# Patient Record
Sex: FEMALE | Race: WHITE
[De-identification: names, ages, dates, MRNs, and addresses within clinical notes are randomized per-mention and may not be internally consistent; named-entity substitution may affect disease eponyms.]

---

## 2017-02-08 ENCOUNTER — HOSPITAL ENCOUNTER (OUTPATIENT)
Dept: HOSPITAL 53 - M OPP | Age: 57
End: 2017-02-08
Attending: INTERNAL MEDICINE
Payer: COMMERCIAL

## 2017-02-08 VITALS — BODY MASS INDEX: 21.16 KG/M2 | WEIGHT: 115 LBS | HEIGHT: 62 IN

## 2017-02-08 VITALS — DIASTOLIC BLOOD PRESSURE: 55 MMHG | SYSTOLIC BLOOD PRESSURE: 109 MMHG

## 2017-02-08 DIAGNOSIS — Z79.82: ICD-10-CM

## 2017-02-08 DIAGNOSIS — M50.90: ICD-10-CM

## 2017-02-08 DIAGNOSIS — K57.30: ICD-10-CM

## 2017-02-08 DIAGNOSIS — R93.3: Primary | ICD-10-CM

## 2017-02-08 DIAGNOSIS — Z79.51: ICD-10-CM

## 2017-02-08 DIAGNOSIS — F41.9: ICD-10-CM

## 2017-02-08 DIAGNOSIS — R12: ICD-10-CM

## 2017-02-08 DIAGNOSIS — E78.5: ICD-10-CM

## 2017-02-08 DIAGNOSIS — K64.0: ICD-10-CM

## 2017-02-08 DIAGNOSIS — Z87.891: ICD-10-CM

## 2017-02-08 DIAGNOSIS — M81.0: ICD-10-CM

## 2017-02-08 DIAGNOSIS — Z79.899: ICD-10-CM

## 2017-02-08 DIAGNOSIS — J44.9: ICD-10-CM

## 2017-02-08 DIAGNOSIS — Z80.3: ICD-10-CM

## 2017-02-08 DIAGNOSIS — K57.32: ICD-10-CM

## 2017-02-08 DIAGNOSIS — G43.909: ICD-10-CM

## 2017-02-08 PROCEDURE — 45378 DIAGNOSTIC COLONOSCOPY: CPT

## 2017-02-08 PROCEDURE — 99157 MOD SED OTHER PHYS/QHP EA: CPT

## 2017-02-08 PROCEDURE — 99156 MOD SED OTH PHYS/QHP 5/>YRS: CPT

## 2017-02-08 NOTE — ROOR
________________________________________________________________________________

Patient Name: Tami Collins         Procedure Date: 2/8/2017 2:11 PM

MRN: [id number]                          Account Number: [id number]

YOB: 1960               Age: 56

Room: [facility]                            Gender: Female

Note Status: Finalized                 

________________________________________________________________________________

 

Procedure:           Colonoscopy to Cecum

Indications:         Last colonoscopy: 2008, Abnormal CT of the GI tract, 

                     Follow-up of diverticulitis

Providers:           Damion Huang MD

Referring MD:        ALEJANDRO Chiang

Requesting Provider: 

Medicines:           Monitored Anesthesia Care

Complications:       No immediate complications.

________________________________________________________________________________

Procedure:           Pre-Anesthesia Assessment:

                     - The heart rate, respiratory rate, oxygen saturations, 

                     blood pressure, adequacy of pulmonary ventilation, and 

                     response to care were monitored throughout the procedure.

                     The Colonoscope was introduced through the anus and 

                     advanced to the cecum, identified by appendiceal orifice 

                     and ileocecal valve. The colonoscopy was performed 

                     without difficulty. The patient tolerated the procedure 

                     well. The quality of the bowel preparation was excellent.

                                                                                

Findings:

     The perianal and digital rectal examinations were normal.

     Non-bleeding internal hemorrhoids were found during retroflexion. The 

     hemorrhoids were small and Grade I (internal hemorrhoids that do not 

     prolapse).

     Multiple small and large-mouthed diverticula were found in the 

     recto-sigmoid colon, sigmoid colon and descending colon.

     The exam was otherwise without abnormality on direct and retroflexion 

     views.

                                                                                

Impression:          - Non-bleeding internal hemorrhoids.

                     - Diverticulosis in the recto-sigmoid colon, in the 

                     sigmoid colon and in the descending colon.

                     - The examination was otherwise normal on direct and 

                     retroflexion views.

                     - No specimens collected.

                     - The exam was otherwise normal to the cecum.

Recommendation:      - Patient has a contact number available for emergencies. 

                     The signs and symptoms of potential delayed complications 

                     were discussed with the patient. Return to normal 

                     activities tomorrow. Written discharge instructions were 

                     provided to the patient.

                     - High fiber diet.

                     - Discharge patient to home.

                     - Continue present medications.

                     - Repeat colonoscopy in 10 years for screening purposes.

                     - Return to referring physician.

                     - The findings and recommendations were discussed with 

                     the patient's family.

                                                                                

 

Damion Huang MD

____________________

Damion Huang MD

2/8/2017 2:34:27 PM

This report has been signed electronically.

Number of Addenda: 0

 

Note Initiated On: 2/8/2017 2:11 PM

Estimated Blood Loss:

     Estimated blood loss: none.

## 2017-06-27 ENCOUNTER — HOSPITAL ENCOUNTER (OUTPATIENT)
Dept: HOSPITAL 53 - M SFHCLERA | Age: 57
End: 2017-06-27
Attending: PHYSICIAN ASSISTANT
Payer: COMMERCIAL

## 2017-06-27 DIAGNOSIS — N28.9: Primary | ICD-10-CM

## 2017-06-27 DIAGNOSIS — E78.2: ICD-10-CM

## 2017-06-27 LAB
ALBUMIN SERPL BCG-MCNC: 4 GM/DL (ref 3.2–5.2)
ALBUMIN/GLOB SERPL: 1.33 {RATIO} (ref 1–1.93)
ALP SERPL-CCNC: 48 U/L (ref 45–117)
ALT SERPL W P-5'-P-CCNC: 22 U/L (ref 12–78)
ANION GAP SERPL CALC-SCNC: 5 MEQ/L (ref 8–16)
AST SERPL-CCNC: 18 U/L (ref 15–37)
BILIRUB SERPL-MCNC: 0.3 MG/DL (ref 0.2–1)
BUN SERPL-MCNC: 16 MG/DL (ref 7–18)
CALCIUM SERPL-MCNC: 8.4 MG/DL (ref 8.5–10.1)
CHLORIDE SERPL-SCNC: 106 MEQ/L (ref 98–107)
CHOLEST SERPL-MCNC: 189 MG/DL (ref ?–200)
CO2 SERPL-SCNC: 29 MEQ/L (ref 21–32)
CREAT SERPL-MCNC: 0.89 MG/DL (ref 0.55–1.02)
ERYTHROCYTE [DISTWIDTH] IN BLOOD BY AUTOMATED COUNT: 11.9 % (ref 11.5–14.5)
GFR SERPL CREATININE-BSD FRML MDRD: > 60 ML/MIN/{1.73_M2} (ref 51–?)
GLUCOSE SERPL-MCNC: 80 MG/DL (ref 70–105)
MCH RBC QN AUTO: 32.2 PG (ref 27–33)
MCHC RBC AUTO-ENTMCNC: 32.7 G/DL (ref 32–36.5)
MCV RBC AUTO: 98.6 FL (ref 80–96)
PLATELET # BLD AUTO: 288 K/MM3 (ref 150–450)
POTASSIUM SERPL-SCNC: 4.4 MEQ/L (ref 3.5–5.1)
PROT SERPL-MCNC: 7 GM/DL (ref 6.4–8.2)
SODIUM SERPL-SCNC: 140 MEQ/L (ref 136–145)
TRIGL SERPL-MCNC: 205 MG/DL (ref ?–150)
WBC # BLD AUTO: 5.1 K/MM3 (ref 4–10)

## 2017-07-18 ENCOUNTER — HOSPITAL ENCOUNTER (OUTPATIENT)
Dept: HOSPITAL 53 - M LAB REF | Age: 57
End: 2017-07-18
Attending: OBSTETRICS & GYNECOLOGY
Payer: COMMERCIAL

## 2017-07-18 DIAGNOSIS — R68.82: ICD-10-CM

## 2017-07-18 DIAGNOSIS — E34.9: ICD-10-CM

## 2017-07-18 DIAGNOSIS — N95.1: Primary | ICD-10-CM

## 2017-07-18 LAB
ESTRADIOL SERPL-MCNC: < 19 PG/ML
FSH SERPL-ACNC: 90.2 MIU/ML
LH SERPL-ACNC: 47.1 MIU/ML
T4 FREE SERPL-MCNC: 1.03 NG/DL (ref 0.76–1.46)

## 2017-12-20 ENCOUNTER — HOSPITAL ENCOUNTER (OUTPATIENT)
Dept: HOSPITAL 53 - M SFHCLERA | Age: 57
End: 2017-12-20
Attending: PHYSICIAN ASSISTANT
Payer: COMMERCIAL

## 2017-12-20 DIAGNOSIS — L29.9: ICD-10-CM

## 2017-12-20 DIAGNOSIS — E55.9: ICD-10-CM

## 2017-12-20 DIAGNOSIS — E78.2: Primary | ICD-10-CM

## 2017-12-20 LAB
ALBUMIN SERPL BCG-MCNC: 4 GM/DL (ref 3.2–5.2)
ALBUMIN/GLOB SERPL: 1.38 {RATIO} (ref 1–1.93)
ALP SERPL-CCNC: 41 U/L (ref 45–117)
ALT SERPL W P-5'-P-CCNC: 32 U/L (ref 12–78)
ANION GAP SERPL CALC-SCNC: 6 MEQ/L (ref 8–16)
AST SERPL-CCNC: 25 U/L (ref 7–37)
BASOPHILS # BLD AUTO: 0.1 10^3/UL (ref 0–0.2)
BASOPHILS NFR BLD AUTO: 0.8 % (ref 0–1)
BILIRUB SERPL-MCNC: 0.4 MG/DL (ref 0.2–1)
BUN SERPL-MCNC: 19 MG/DL (ref 7–18)
CALCIUM SERPL-MCNC: 8.3 MG/DL (ref 8.5–10.1)
CHLORIDE SERPL-SCNC: 102 MEQ/L (ref 98–107)
CHOLEST SERPL-MCNC: 135 MG/DL (ref ?–200)
CO2 SERPL-SCNC: 31 MEQ/L (ref 21–32)
CREAT SERPL-MCNC: 1.02 MG/DL (ref 0.55–1.02)
EOSINOPHIL # BLD AUTO: 0.2 10^3/UL (ref 0–0.5)
EOSINOPHIL NFR BLD AUTO: 2 % (ref 0–3)
ERYTHROCYTE [DISTWIDTH] IN BLOOD BY AUTOMATED COUNT: 11.9 % (ref 11.5–14.5)
GFR SERPL CREATININE-BSD FRML MDRD: 59 ML/MIN/{1.73_M2} (ref 51–?)
GLUCOSE SERPL-MCNC: 78 MG/DL (ref 70–105)
IMM GRANULOCYTES NFR BLD: 0.7 % (ref 0–0)
LYMPHOCYTES # BLD AUTO: 2 10^3/UL (ref 1.5–4.5)
LYMPHOCYTES NFR BLD AUTO: 26.6 % (ref 24–44)
MCH RBC QN AUTO: 29.7 PG (ref 27–33)
MCHC RBC AUTO-ENTMCNC: 33.7 G/DL (ref 32–36.5)
MCV RBC AUTO: 88.1 FL (ref 80–96)
MONOCYTES # BLD AUTO: 0.9 10^3/UL (ref 0–0.8)
MONOCYTES NFR BLD AUTO: 11.6 % (ref 0–5)
NEUTROPHILS # BLD AUTO: 4.5 10^3/UL (ref 1.8–7.7)
NEUTROPHILS NFR BLD AUTO: 58.3 % (ref 36–66)
NRBC BLD AUTO-RTO: 0 % (ref 0–0)
PLATELET # BLD AUTO: 289 10^3/UL (ref 150–450)
POTASSIUM SERPL-SCNC: 4 MEQ/L (ref 3.5–5.1)
PROT SERPL-MCNC: 6.9 GM/DL (ref 6.4–8.2)
SODIUM SERPL-SCNC: 139 MEQ/L (ref 136–145)
TRIGL SERPL-MCNC: 112 MG/DL (ref ?–150)
WBC # BLD AUTO: 7.7 10^3/UL (ref 4–10)

## 2017-12-27 ENCOUNTER — HOSPITAL ENCOUNTER (OUTPATIENT)
Dept: HOSPITAL 53 - M RAD | Age: 57
End: 2017-12-27
Attending: PHYSICIAN ASSISTANT
Payer: COMMERCIAL

## 2017-12-27 DIAGNOSIS — R91.8: Primary | ICD-10-CM

## 2017-12-27 DIAGNOSIS — N28.1: ICD-10-CM

## 2018-06-27 ENCOUNTER — HOSPITAL ENCOUNTER (OUTPATIENT)
Dept: HOSPITAL 53 - M SFHCLERA | Age: 58
End: 2018-06-27
Attending: PHYSICIAN ASSISTANT
Payer: COMMERCIAL

## 2018-06-27 DIAGNOSIS — J44.1: ICD-10-CM

## 2018-06-27 DIAGNOSIS — E55.9: ICD-10-CM

## 2018-06-27 DIAGNOSIS — E78.2: Primary | ICD-10-CM

## 2018-06-27 LAB
25(OH)D3 SERPL-MCNC: 21.2 NG/ML (ref 30–100)
ALBUMIN/GLOBULIN RATIO: 1.16 (ref 1–1.93)
ALBUMIN: 3.6 GM/DL (ref 3.2–5.2)
ALKALINE PHOSPHATASE: 50 U/L (ref 45–117)
ALT SERPL W P-5'-P-CCNC: 32 U/L (ref 12–78)
ANION GAP: 7 MEQ/L (ref 8–16)
AST SERPL-CCNC: 18 U/L (ref 7–37)
BILIRUBIN,TOTAL: 0.3 MG/DL (ref 0.2–1)
BLOOD UREA NITROGEN: 14 MG/DL (ref 7–18)
CALCIUM LEVEL: 8.3 MG/DL (ref 8.8–10.2)
CARBON DIOXIDE LEVEL: 27 MEQ/L (ref 21–32)
CHLORIDE LEVEL: 106 MEQ/L (ref 98–107)
CHOLEST SERPL-MCNC: 122 MG/DL (ref ?–200)
CHOLESTEROL RISK RATIO: 2.98 (ref ?–5)
CREATININE FOR GFR: 1.01 MG/DL (ref 0.55–1.3)
GFR SERPL CREATININE-BSD FRML MDRD: 59.5 ML/MIN/{1.73_M2} (ref 45–?)
GLUCOSE, FASTING: 95 MG/DL (ref 70–100)
HDLC SERPL-MCNC: 41 MG/DL (ref 40–?)
HEMATOCRIT: 38.6 % (ref 36–47)
HEMOGLOBIN: 12.5 G/DL (ref 12–15.5)
LDL CHOLESTEROL: 53.4 MG/DL (ref ?–100)
MEAN CORPUSCULAR HEMOGLOBIN: 30 PG (ref 27–33)
MEAN CORPUSCULAR HGB CONC: 32.4 G/DL (ref 32–36.5)
MEAN CORPUSCULAR VOLUME: 92.8 FL (ref 80–96)
NONHDLC SERPL-MCNC: 81 MG/DL
NRBC BLD AUTO-RTO: 0 % (ref 0–0)
PLATELET COUNT, AUTOMATED: 265 10^3/UL (ref 150–450)
POTASSIUM SERUM: 4.4 MEQ/L (ref 3.5–5.1)
RED BLOOD COUNT: 4.16 10^6/UL (ref 4–5.4)
RED CELL DISTRIBUTION WIDTH: 12.4 % (ref 11.5–14.5)
SODIUM LEVEL: 140 MEQ/L (ref 136–145)
TOTAL PROTEIN: 6.7 GM/DL (ref 6.4–8.2)
TRIGLYCERIDES LEVEL: 138 MG/DL (ref ?–150)
WHITE BLOOD COUNT: 8.3 10^3/UL (ref 4–10)

## 2018-06-27 PROCEDURE — 80053 COMPREHEN METABOLIC PANEL: CPT

## 2019-01-27 ENCOUNTER — HOSPITAL ENCOUNTER (OUTPATIENT)
Dept: HOSPITAL 53 - M SFHCADAM | Age: 59
End: 2019-01-27
Attending: PHYSICIAN ASSISTANT
Payer: COMMERCIAL

## 2019-01-27 ENCOUNTER — HOSPITAL ENCOUNTER (OUTPATIENT)
Dept: HOSPITAL 53 - M ADAMS | Age: 59
End: 2019-01-27
Attending: PHYSICIAN ASSISTANT
Payer: COMMERCIAL

## 2019-01-27 DIAGNOSIS — E78.2: ICD-10-CM

## 2019-01-27 DIAGNOSIS — E78.2: Primary | ICD-10-CM

## 2019-01-27 DIAGNOSIS — K76.0: Primary | ICD-10-CM

## 2019-01-27 DIAGNOSIS — K21.9: ICD-10-CM

## 2019-01-27 DIAGNOSIS — E78.1: ICD-10-CM

## 2019-01-27 DIAGNOSIS — M81.0: ICD-10-CM

## 2019-01-27 DIAGNOSIS — E55.9: ICD-10-CM

## 2019-01-27 DIAGNOSIS — K76.0: ICD-10-CM

## 2019-01-27 LAB
ALBUMIN SERPL BCG-MCNC: 3.9 GM/DL (ref 3.2–5.2)
ALT SERPL W P-5'-P-CCNC: 43 U/L (ref 12–78)
BILIRUB SERPL-MCNC: 0.5 MG/DL (ref 0.2–1)
BUN SERPL-MCNC: 15 MG/DL (ref 7–18)
CALCIUM SERPL-MCNC: 7.9 MG/DL (ref 8.8–10.2)
CHLORIDE SERPL-SCNC: 105 MEQ/L (ref 98–107)
CHOLEST SERPL-MCNC: 139 MG/DL (ref ?–200)
CHOLEST/HDLC SERPL: 2.84 {RATIO} (ref ?–5)
CO2 SERPL-SCNC: 27 MEQ/L (ref 21–32)
CREAT SERPL-MCNC: 1 MG/DL (ref 0.55–1.3)
GFR SERPL CREATININE-BSD FRML MDRD: > 60 ML/MIN/{1.73_M2} (ref 45–?)
GLUCOSE SERPL-MCNC: 95 MG/DL (ref 70–100)
HDLC SERPL-MCNC: 49 MG/DL (ref 40–?)
LDLC SERPL CALC-MCNC: 65 MG/DL (ref ?–100)
NONHDLC SERPL-MCNC: 90 MG/DL
POTASSIUM SERPL-SCNC: 4.1 MEQ/L (ref 3.5–5.1)
PROT SERPL-MCNC: 6.6 GM/DL (ref 6.4–8.2)
SODIUM SERPL-SCNC: 141 MEQ/L (ref 136–145)
TRIGL SERPL-MCNC: 126 MG/DL (ref ?–150)
TSH SERPL DL<=0.005 MIU/L-ACNC: 2.92 UIU/ML (ref 0.36–3.74)

## 2019-01-28 LAB — 25(OH)D3 SERPL-MCNC: 25.6 NG/ML (ref 30–100)

## 2019-10-01 ENCOUNTER — HOSPITAL ENCOUNTER (OUTPATIENT)
Dept: HOSPITAL 53 - M LABDRWAD | Age: 59
End: 2019-10-01
Attending: PHYSICIAN ASSISTANT
Payer: COMMERCIAL

## 2019-10-01 DIAGNOSIS — Z20.4: ICD-10-CM

## 2019-10-01 DIAGNOSIS — R76.11: Primary | ICD-10-CM

## 2019-10-01 DIAGNOSIS — Z20.828: ICD-10-CM

## 2019-10-02 LAB — RUBELLA IGG QUALITATIVE: (no result)

## 2019-10-10 ENCOUNTER — HOSPITAL ENCOUNTER (OUTPATIENT)
Dept: HOSPITAL 53 - M ADAMS | Age: 59
End: 2019-10-10
Attending: PHYSICIAN ASSISTANT
Payer: COMMERCIAL

## 2019-10-10 DIAGNOSIS — J98.4: Primary | ICD-10-CM

## 2019-10-10 DIAGNOSIS — Z87.891: ICD-10-CM

## 2019-10-10 NOTE — REP
PA and lateral chest:

Comparisons are 07/11/2012 and 03/25/2016.

Lung fields are chronically hyperinflated but otherwise clear , unchanged.

Cardiac size is normal.  The tj, mediastinum, skeletal structures are

unremarkable.

Impression:

Chronic hyperinflation, otherwise negative PA and lateral chest.

 

 

Electronically Signed by

Shan Degroot MD 10/10/2019 05:46 P

## 2019-10-21 ENCOUNTER — HOSPITAL ENCOUNTER (OUTPATIENT)
Dept: HOSPITAL 53 - M SFHCADAM | Age: 59
End: 2019-10-21
Attending: PHYSICIAN ASSISTANT
Payer: COMMERCIAL

## 2019-10-21 DIAGNOSIS — Z22.7: Primary | ICD-10-CM

## 2019-10-21 LAB
ALBUMIN SERPL BCG-MCNC: 3.9 GM/DL (ref 3.2–5.2)
ALT SERPL W P-5'-P-CCNC: 35 U/L (ref 12–78)
BILIRUB CONJ SERPL-MCNC: < 0.1 MG/DL (ref 0–0.2)
BILIRUB SERPL-MCNC: 0.2 MG/DL (ref 0.2–1)
PROT SERPL-MCNC: 7.1 GM/DL (ref 6.4–8.2)

## 2019-11-21 ENCOUNTER — HOSPITAL ENCOUNTER (OUTPATIENT)
Dept: HOSPITAL 53 - M SFHCADAM | Age: 59
End: 2019-11-21
Attending: PHYSICIAN ASSISTANT
Payer: COMMERCIAL

## 2019-11-21 DIAGNOSIS — Z22.7: Primary | ICD-10-CM

## 2019-11-21 LAB
ALBUMIN SERPL BCG-MCNC: 4 GM/DL (ref 3.2–5.2)
ALT SERPL W P-5'-P-CCNC: 34 U/L (ref 12–78)
BILIRUB CONJ SERPL-MCNC: 0.1 MG/DL (ref 0–0.2)
BILIRUB SERPL-MCNC: 0.3 MG/DL (ref 0.2–1)
PROT SERPL-MCNC: 7 GM/DL (ref 6.4–8.2)

## 2019-12-19 ENCOUNTER — HOSPITAL ENCOUNTER (OUTPATIENT)
Dept: HOSPITAL 53 - M SFHCADAM | Age: 59
End: 2019-12-19
Attending: PHYSICIAN ASSISTANT
Payer: COMMERCIAL

## 2019-12-19 DIAGNOSIS — Z22.7: Primary | ICD-10-CM

## 2019-12-19 LAB
ALBUMIN SERPL BCG-MCNC: 4.1 GM/DL (ref 3.2–5.2)
ALT SERPL W P-5'-P-CCNC: 34 U/L (ref 12–78)
BILIRUB CONJ SERPL-MCNC: 0.2 MG/DL (ref 0–0.2)
BILIRUB SERPL-MCNC: 0.3 MG/DL (ref 0.2–1)
PROT SERPL-MCNC: 7.2 GM/DL (ref 6.4–8.2)

## 2020-01-03 ENCOUNTER — HOSPITAL ENCOUNTER (OUTPATIENT)
Dept: HOSPITAL 53 - M LABDRWAD | Age: 60
End: 2020-01-03
Attending: PHYSICIAN ASSISTANT
Payer: COMMERCIAL

## 2020-01-03 DIAGNOSIS — Z22.7: Primary | ICD-10-CM

## 2020-01-03 LAB
ALBUMIN SERPL BCG-MCNC: 4 GM/DL (ref 3.2–5.2)
ALT SERPL W P-5'-P-CCNC: 34 U/L (ref 12–78)
BILIRUB CONJ SERPL-MCNC: < 0.1 MG/DL (ref 0–0.2)
BILIRUB SERPL-MCNC: 0.3 MG/DL (ref 0.2–1)
PROT SERPL-MCNC: 7 GM/DL (ref 6.4–8.2)

## 2020-01-21 ENCOUNTER — HOSPITAL ENCOUNTER (OUTPATIENT)
Dept: HOSPITAL 53 - M SFHCADAM | Age: 60
End: 2020-01-21
Attending: PHYSICIAN ASSISTANT
Payer: COMMERCIAL

## 2020-01-21 DIAGNOSIS — Z22.7: Primary | ICD-10-CM

## 2020-01-21 LAB
ALBUMIN SERPL BCG-MCNC: 4.3 GM/DL (ref 3.2–5.2)
ALT SERPL W P-5'-P-CCNC: 37 U/L (ref 12–78)
BILIRUB CONJ SERPL-MCNC: 0.2 MG/DL (ref 0–0.2)
BILIRUB SERPL-MCNC: 0.3 MG/DL (ref 0.2–1)
PROT SERPL-MCNC: 7.1 GM/DL (ref 6.4–8.2)

## 2020-02-21 ENCOUNTER — HOSPITAL ENCOUNTER (OUTPATIENT)
Dept: HOSPITAL 53 - M SFHCADAM | Age: 60
End: 2020-02-21
Attending: PHYSICIAN ASSISTANT
Payer: COMMERCIAL

## 2020-02-21 DIAGNOSIS — E55.9: ICD-10-CM

## 2020-02-21 DIAGNOSIS — K21.9: ICD-10-CM

## 2020-02-21 DIAGNOSIS — M81.0: ICD-10-CM

## 2020-02-21 DIAGNOSIS — K76.0: ICD-10-CM

## 2020-02-21 DIAGNOSIS — E78.2: Primary | ICD-10-CM

## 2020-02-21 DIAGNOSIS — Z22.7: ICD-10-CM

## 2020-02-21 LAB
25(OH)D3 SERPL-MCNC: 29.3 NG/ML (ref 30–100)
ALBUMIN SERPL BCG-MCNC: 4.3 GM/DL (ref 3.2–5.2)
ALT SERPL W P-5'-P-CCNC: 37 U/L (ref 12–78)
BASOPHILS # BLD AUTO: 0.1 10^3/UL (ref 0–0.2)
BASOPHILS NFR BLD AUTO: 0.8 % (ref 0–1)
BILIRUB CONJ SERPL-MCNC: 0.1 MG/DL (ref 0–0.2)
BILIRUB SERPL-MCNC: 0.3 MG/DL (ref 0.2–1)
BUN SERPL-MCNC: 25 MG/DL (ref 7–18)
CALCIUM SERPL-MCNC: 8.6 MG/DL (ref 8.8–10.2)
CHLORIDE SERPL-SCNC: 106 MEQ/L (ref 98–107)
CHOLEST SERPL-MCNC: 149 MG/DL (ref ?–200)
CHOLEST/HDLC SERPL: 3.39 {RATIO} (ref ?–5)
CO2 SERPL-SCNC: 28 MEQ/L (ref 21–32)
CREAT SERPL-MCNC: 0.99 MG/DL (ref 0.55–1.3)
EOSINOPHIL # BLD AUTO: 0.3 10^3/UL (ref 0–0.5)
EOSINOPHIL NFR BLD AUTO: 4.4 % (ref 0–3)
GFR SERPL CREATININE-BSD FRML MDRD: > 60 ML/MIN/{1.73_M2} (ref 45–?)
GLUCOSE SERPL-MCNC: 93 MG/DL (ref 70–100)
HCT VFR BLD AUTO: 37.7 % (ref 36–47)
HDLC SERPL-MCNC: 44 MG/DL (ref 40–?)
HGB BLD-MCNC: 12 G/DL (ref 12–15.5)
LDLC SERPL CALC-MCNC: 76 MG/DL (ref ?–100)
LYMPHOCYTES # BLD AUTO: 1.7 10^3/UL (ref 1.5–5)
LYMPHOCYTES NFR BLD AUTO: 25.2 % (ref 24–44)
MCH RBC QN AUTO: 30.2 PG (ref 27–33)
MCHC RBC AUTO-ENTMCNC: 31.8 G/DL (ref 32–36.5)
MCV RBC AUTO: 95 FL (ref 80–96)
MONOCYTES # BLD AUTO: 0.7 10^3/UL (ref 0–0.8)
MONOCYTES NFR BLD AUTO: 10.2 % (ref 0–5)
NEUTROPHILS # BLD AUTO: 3.9 10^3/UL (ref 1.5–8.5)
NEUTROPHILS NFR BLD AUTO: 58.6 % (ref 36–66)
NONHDLC SERPL-MCNC: 105 MG/DL
PLATELET # BLD AUTO: 249 10^3/UL (ref 150–450)
POTASSIUM SERPL-SCNC: 4.1 MEQ/L (ref 3.5–5.1)
PROT SERPL-MCNC: 7.2 GM/DL (ref 6.4–8.2)
RBC # BLD AUTO: 3.97 10^6/UL (ref 4–5.4)
SODIUM SERPL-SCNC: 138 MEQ/L (ref 136–145)
T4 FREE SERPL-MCNC: 1.08 NG/DL (ref 0.76–1.46)
TRIGL SERPL-MCNC: 144 MG/DL (ref ?–150)
TSH SERPL DL<=0.005 MIU/L-ACNC: 4.15 UIU/ML (ref 0.36–3.74)
WBC # BLD AUTO: 6.6 10^3/UL (ref 4–10)

## 2020-03-20 ENCOUNTER — HOSPITAL ENCOUNTER (OUTPATIENT)
Dept: HOSPITAL 53 - M SFHCADAM | Age: 60
End: 2020-03-20
Attending: PHYSICIAN ASSISTANT
Payer: COMMERCIAL

## 2020-03-20 DIAGNOSIS — R79.89: Primary | ICD-10-CM

## 2020-03-20 DIAGNOSIS — Z22.7: ICD-10-CM

## 2020-03-20 LAB
ALBUMIN SERPL BCG-MCNC: 3.8 GM/DL (ref 3.2–5.2)
ALT SERPL W P-5'-P-CCNC: 35 U/L (ref 12–78)
BILIRUB CONJ SERPL-MCNC: < 0.1 MG/DL (ref 0–0.2)
BILIRUB SERPL-MCNC: 0.2 MG/DL (ref 0.2–1)
PROT SERPL-MCNC: 6.5 GM/DL (ref 6.4–8.2)
T4 FREE SERPL-MCNC: 0.95 NG/DL (ref 0.76–1.46)
TSH SERPL DL<=0.005 MIU/L-ACNC: 2.09 UIU/ML (ref 0.36–3.74)

## 2020-04-22 ENCOUNTER — HOSPITAL ENCOUNTER (OUTPATIENT)
Dept: HOSPITAL 53 - M SFHCADAM | Age: 60
End: 2020-04-22
Attending: PHYSICIAN ASSISTANT
Payer: COMMERCIAL

## 2020-04-22 DIAGNOSIS — Z22.7: Primary | ICD-10-CM

## 2020-04-22 LAB
ALBUMIN SERPL BCG-MCNC: 3.9 GM/DL (ref 3.2–5.2)
ALT SERPL W P-5'-P-CCNC: 34 U/L (ref 12–78)
BILIRUB CONJ SERPL-MCNC: 0.1 MG/DL (ref 0–0.2)
BILIRUB SERPL-MCNC: 0.4 MG/DL (ref 0.2–1)
PROT SERPL-MCNC: 7 GM/DL (ref 6.4–8.2)

## 2020-05-21 ENCOUNTER — HOSPITAL ENCOUNTER (OUTPATIENT)
Dept: HOSPITAL 53 - M SFHCADAM | Age: 60
End: 2020-05-21
Attending: PHYSICIAN ASSISTANT
Payer: COMMERCIAL

## 2020-05-21 DIAGNOSIS — Z22.7: Primary | ICD-10-CM

## 2020-05-21 LAB
ALBUMIN SERPL BCG-MCNC: 4.1 GM/DL (ref 3.2–5.2)
ALT SERPL W P-5'-P-CCNC: 39 U/L (ref 12–78)
BILIRUB CONJ SERPL-MCNC: < 0.1 MG/DL (ref 0–0.2)
BILIRUB SERPL-MCNC: 0.3 MG/DL (ref 0.2–1)
PROT SERPL-MCNC: 6.8 GM/DL (ref 6.4–8.2)

## 2020-06-24 ENCOUNTER — HOSPITAL ENCOUNTER (OUTPATIENT)
Dept: HOSPITAL 53 - M SFHCADAM | Age: 60
End: 2020-06-24
Attending: PHYSICIAN ASSISTANT
Payer: COMMERCIAL

## 2020-06-24 DIAGNOSIS — E78.2: ICD-10-CM

## 2020-06-24 DIAGNOSIS — Z22.7: Primary | ICD-10-CM

## 2020-06-24 DIAGNOSIS — K76.0: ICD-10-CM

## 2020-06-24 DIAGNOSIS — E78.1: ICD-10-CM

## 2020-06-24 DIAGNOSIS — E55.9: ICD-10-CM

## 2020-06-24 LAB
25(OH)D3 SERPL-MCNC: 25.6 NG/ML (ref 30–100)
ALBUMIN SERPL BCG-MCNC: 3.8 GM/DL (ref 3.2–5.2)
ALT SERPL W P-5'-P-CCNC: 30 U/L (ref 12–78)
BASOPHILS # BLD AUTO: 0 10^3/UL (ref 0–0.2)
BASOPHILS NFR BLD AUTO: 0.8 % (ref 0–1)
BILIRUB SERPL-MCNC: 0.3 MG/DL (ref 0.2–1)
BUN SERPL-MCNC: 18 MG/DL (ref 7–18)
CALCIUM SERPL-MCNC: 8.6 MG/DL (ref 8.8–10.2)
CHLORIDE SERPL-SCNC: 109 MEQ/L (ref 98–107)
CHOLEST SERPL-MCNC: 133 MG/DL (ref ?–200)
CHOLEST/HDLC SERPL: 3.91 {RATIO} (ref ?–5)
CO2 SERPL-SCNC: 27 MEQ/L (ref 21–32)
CREAT SERPL-MCNC: 0.97 MG/DL (ref 0.55–1.3)
EOSINOPHIL # BLD AUTO: 0.1 10^3/UL (ref 0–0.5)
EOSINOPHIL NFR BLD AUTO: 2.7 % (ref 0–3)
GFR SERPL CREATININE-BSD FRML MDRD: > 60 ML/MIN/{1.73_M2} (ref 45–?)
GLUCOSE SERPL-MCNC: 99 MG/DL (ref 70–100)
HCT VFR BLD AUTO: 36.4 % (ref 36–47)
HDLC SERPL-MCNC: 34 MG/DL (ref 40–?)
HGB BLD-MCNC: 11.8 G/DL (ref 12–15.5)
LDLC SERPL CALC-MCNC: 68 MG/DL (ref ?–100)
LYMPHOCYTES # BLD AUTO: 1.7 10^3/UL (ref 1.5–5)
LYMPHOCYTES NFR BLD AUTO: 32.3 % (ref 24–44)
MCH RBC QN AUTO: 30.2 PG (ref 27–33)
MCHC RBC AUTO-ENTMCNC: 32.4 G/DL (ref 32–36.5)
MCV RBC AUTO: 93.1 FL (ref 80–96)
MONOCYTES # BLD AUTO: 0.6 10^3/UL (ref 0–0.8)
MONOCYTES NFR BLD AUTO: 12 % (ref 0–5)
NEUTROPHILS # BLD AUTO: 2.7 10^3/UL (ref 1.5–8.5)
NEUTROPHILS NFR BLD AUTO: 51.1 % (ref 36–66)
NONHDLC SERPL-MCNC: 99 MG/DL
PLATELET # BLD AUTO: 210 10^3/UL (ref 150–450)
POTASSIUM SERPL-SCNC: 3.9 MEQ/L (ref 3.5–5.1)
PROT SERPL-MCNC: 6.6 GM/DL (ref 6.4–8.2)
RBC # BLD AUTO: 3.91 10^6/UL (ref 4–5.4)
SODIUM SERPL-SCNC: 142 MEQ/L (ref 136–145)
TRIGL SERPL-MCNC: 154 MG/DL (ref ?–150)
TSH SERPL DL<=0.005 MIU/L-ACNC: 7.58 UIU/ML (ref 0.36–3.74)
WBC # BLD AUTO: 5.3 10^3/UL (ref 4–10)

## 2020-12-08 ENCOUNTER — HOSPITAL ENCOUNTER (OUTPATIENT)
Dept: HOSPITAL 53 - M LAB REF | Age: 60
End: 2020-12-08
Attending: PHYSICIAN ASSISTANT
Payer: COMMERCIAL

## 2020-12-08 DIAGNOSIS — N39.0: Primary | ICD-10-CM

## 2020-12-28 ENCOUNTER — HOSPITAL ENCOUNTER (OUTPATIENT)
Dept: HOSPITAL 53 - M SFHCADAM | Age: 60
End: 2020-12-28
Attending: PHYSICIAN ASSISTANT
Payer: COMMERCIAL

## 2020-12-28 DIAGNOSIS — R79.89: Primary | ICD-10-CM

## 2021-04-06 ENCOUNTER — HOSPITAL ENCOUNTER (OUTPATIENT)
Dept: HOSPITAL 53 - M SFHCADAM | Age: 61
End: 2021-04-06
Attending: PHYSICIAN ASSISTANT
Payer: COMMERCIAL

## 2021-04-06 DIAGNOSIS — R82.90: Primary | ICD-10-CM

## 2021-04-06 LAB
APPEARANCE UR: CLEAR
BACTERIA UR QL AUTO: NEGATIVE
BILIRUB UR QL STRIP.AUTO: NEGATIVE
GLUCOSE UR QL STRIP.AUTO: NEGATIVE MG/DL
HGB UR QL STRIP.AUTO: (no result)
KETONES UR QL STRIP.AUTO: NEGATIVE MG/DL
LEUKOCYTE ESTERASE UR QL STRIP.AUTO: NEGATIVE
NITRITE UR QL STRIP.AUTO: NEGATIVE
PH UR STRIP.AUTO: 5 UNITS (ref 5–9)
PROT UR QL STRIP.AUTO: NEGATIVE MG/DL
RBC # UR AUTO: 4 /HPF (ref 0–3)
SP GR UR STRIP.AUTO: 1.01 (ref 1–1.03)
SQUAMOUS #/AREA URNS AUTO: 0 /HPF (ref 0–6)
UROBILINOGEN UR QL STRIP.AUTO: 0.2 MG/DL (ref 0–2)
WBC #/AREA URNS AUTO: 1 /HPF (ref 0–3)

## 2021-05-04 ENCOUNTER — HOSPITAL ENCOUNTER (OUTPATIENT)
Dept: HOSPITAL 53 - M SFHCADAM | Age: 61
End: 2021-05-04
Attending: PHYSICIAN ASSISTANT
Payer: COMMERCIAL

## 2021-05-04 DIAGNOSIS — R82.90: Primary | ICD-10-CM

## 2021-05-04 LAB
APPEARANCE UR: CLEAR
BACTERIA UR QL AUTO: NEGATIVE
BILIRUB UR QL STRIP.AUTO: NEGATIVE
GLUCOSE UR QL STRIP.AUTO: NEGATIVE MG/DL
HGB UR QL STRIP.AUTO: (no result)
KETONES UR QL STRIP.AUTO: NEGATIVE MG/DL
LEUKOCYTE ESTERASE UR QL STRIP.AUTO: (no result)
MUCOUS THREADS URNS QL MICRO: (no result)
NITRITE UR QL STRIP.AUTO: NEGATIVE
PH UR STRIP.AUTO: 5 UNITS (ref 5–9)
PROT UR QL STRIP.AUTO: NEGATIVE MG/DL
RBC # UR AUTO: 2 /HPF (ref 0–3)
SP GR UR STRIP.AUTO: 1.02 (ref 1–1.03)
SQUAMOUS #/AREA URNS AUTO: 1 /HPF (ref 0–6)
UROBILINOGEN UR QL STRIP.AUTO: 0.2 MG/DL (ref 0–2)
WBC #/AREA URNS AUTO: 9 /HPF (ref 0–3)

## 2021-06-18 ENCOUNTER — HOSPITAL ENCOUNTER (OUTPATIENT)
Dept: HOSPITAL 53 - M SMT | Age: 61
End: 2021-06-18
Attending: NURSE PRACTITIONER
Payer: COMMERCIAL

## 2021-06-18 DIAGNOSIS — R31.29: Primary | ICD-10-CM

## 2021-06-18 LAB
APPEARANCE UR: CLEAR
BACTERIA UR QL AUTO: (no result)
BILIRUB UR QL STRIP.AUTO: NEGATIVE
GLUCOSE UR QL STRIP.AUTO: NEGATIVE MG/DL
HGB UR QL STRIP.AUTO: (no result)
KETONES UR QL STRIP.AUTO: NEGATIVE MG/DL
LEUKOCYTE ESTERASE UR QL STRIP.AUTO: (no result)
NITRITE UR QL STRIP.AUTO: NEGATIVE
PH UR STRIP.AUTO: 6 UNITS (ref 5–9)
PROT UR QL STRIP.AUTO: NEGATIVE MG/DL
RBC # UR AUTO: 2 /HPF (ref 0–3)
SP GR UR STRIP.AUTO: 1.01 (ref 1–1.03)
SQUAMOUS #/AREA URNS AUTO: 0 /HPF (ref 0–6)
UROBILINOGEN UR QL STRIP.AUTO: 0.2 MG/DL (ref 0–2)
WBC #/AREA URNS AUTO: 17 /HPF (ref 0–3)

## 2021-07-16 ENCOUNTER — HOSPITAL ENCOUNTER (OUTPATIENT)
Dept: HOSPITAL 53 - M SFHCADAM | Age: 61
End: 2021-07-16
Attending: PHYSICIAN ASSISTANT
Payer: COMMERCIAL

## 2021-07-16 DIAGNOSIS — G47.00: ICD-10-CM

## 2021-07-16 DIAGNOSIS — E78.2: Primary | ICD-10-CM

## 2021-07-16 DIAGNOSIS — M81.0: ICD-10-CM

## 2021-07-16 DIAGNOSIS — K21.9: ICD-10-CM

## 2021-07-16 DIAGNOSIS — E78.1: ICD-10-CM

## 2021-07-16 DIAGNOSIS — G43.709: ICD-10-CM

## 2021-07-16 LAB
25(OH)D3 SERPL-MCNC: 24.4 NG/ML (ref 30–100)
ALBUMIN SERPL BCG-MCNC: 3.7 GM/DL (ref 3.2–5.2)
ALT SERPL W P-5'-P-CCNC: 36 U/L (ref 12–78)
BASOPHILS # BLD AUTO: 0 10^3/UL (ref 0–0.2)
BASOPHILS NFR BLD AUTO: 0.7 % (ref 0–1)
BILIRUB SERPL-MCNC: 0.3 MG/DL (ref 0.2–1)
BUN SERPL-MCNC: 13 MG/DL (ref 7–18)
CALCIUM SERPL-MCNC: 8.3 MG/DL (ref 8.8–10.2)
CHLORIDE SERPL-SCNC: 109 MEQ/L (ref 98–107)
CHOLEST SERPL-MCNC: 153 MG/DL (ref ?–200)
CHOLEST/HDLC SERPL: 3.83 {RATIO} (ref ?–5)
CO2 SERPL-SCNC: 28 MEQ/L (ref 21–32)
CREAT SERPL-MCNC: 1.04 MG/DL (ref 0.55–1.3)
EOSINOPHIL # BLD AUTO: 0.1 10^3/UL (ref 0–0.5)
EOSINOPHIL NFR BLD AUTO: 2.6 % (ref 0–3)
GFR SERPL CREATININE-BSD FRML MDRD: 57 ML/MIN/{1.73_M2} (ref 45–?)
GLUCOSE SERPL-MCNC: 94 MG/DL (ref 70–100)
HCT VFR BLD AUTO: 37.2 % (ref 36–47)
HDLC SERPL-MCNC: 40 MG/DL (ref 40–?)
HGB BLD-MCNC: 11.6 G/DL (ref 12–15.5)
LDLC SERPL CALC-MCNC: 87 MG/DL (ref ?–100)
LYMPHOCYTES # BLD AUTO: 1.7 10^3/UL (ref 1.5–5)
LYMPHOCYTES NFR BLD AUTO: 30.8 % (ref 24–44)
MCH RBC QN AUTO: 30.3 PG (ref 27–33)
MCHC RBC AUTO-ENTMCNC: 31.2 G/DL (ref 32–36.5)
MCV RBC AUTO: 97.1 FL (ref 80–96)
MONOCYTES # BLD AUTO: 0.5 10^3/UL (ref 0–0.8)
MONOCYTES NFR BLD AUTO: 9.5 % (ref 2–8)
NEUTROPHILS # BLD AUTO: 3 10^3/UL (ref 1.5–8.5)
NEUTROPHILS NFR BLD AUTO: 55.7 % (ref 36–66)
NONHDLC SERPL-MCNC: 113 MG/DL
PLATELET # BLD AUTO: 230 10^3/UL (ref 150–450)
POTASSIUM SERPL-SCNC: 3.7 MEQ/L (ref 3.5–5.1)
PROT SERPL-MCNC: 6.6 GM/DL (ref 6.4–8.2)
RBC # BLD AUTO: 3.83 10^6/UL (ref 4–5.4)
SODIUM SERPL-SCNC: 138 MEQ/L (ref 136–145)
TRIGL SERPL-MCNC: 132 MG/DL (ref ?–150)
TSH SERPL DL<=0.005 MIU/L-ACNC: 6.67 UIU/ML (ref 0.36–3.74)
WBC # BLD AUTO: 5.5 10^3/UL (ref 4–10)

## 2021-08-23 ENCOUNTER — HOSPITAL ENCOUNTER (OUTPATIENT)
Dept: HOSPITAL 53 - M SMT | Age: 61
End: 2021-08-23
Attending: NURSE PRACTITIONER
Payer: COMMERCIAL

## 2021-08-23 DIAGNOSIS — R30.0: Primary | ICD-10-CM

## 2021-08-23 LAB
AMORPH SED URNS QL MICRO: (no result)
APPEARANCE UR: (no result)
BACTERIA UR QL AUTO: (no result)
BILIRUB UR QL STRIP.AUTO: NEGATIVE
GLUCOSE UR QL STRIP.AUTO: NEGATIVE MG/DL
HGB UR QL STRIP.AUTO: (no result)
KETONES UR QL STRIP.AUTO: NEGATIVE MG/DL
LEUKOCYTE ESTERASE UR QL STRIP.AUTO: (no result)
MUCOUS THREADS URNS QL MICRO: (no result)
NITRITE UR QL STRIP.AUTO: NEGATIVE
PH UR STRIP.AUTO: 5 UNITS (ref 5–9)
PROT UR QL STRIP.AUTO: (no result) MG/DL
RBC # UR AUTO: 2 /HPF (ref 0–3)
SP GR UR STRIP.AUTO: 1.02 (ref 1–1.03)
SQUAMOUS #/AREA URNS AUTO: 2 /HPF (ref 0–6)
UROBILINOGEN UR QL STRIP.AUTO: 0.2 MG/DL (ref 0–2)
WBC #/AREA URNS AUTO: 71 /HPF (ref 0–3)

## 2021-08-30 ENCOUNTER — HOSPITAL ENCOUNTER (OUTPATIENT)
Dept: HOSPITAL 53 - M SFHCADAM | Age: 61
End: 2021-08-30
Attending: PHYSICIAN ASSISTANT
Payer: COMMERCIAL

## 2021-08-30 DIAGNOSIS — E03.9: Primary | ICD-10-CM

## 2021-11-17 ENCOUNTER — HOSPITAL ENCOUNTER (OUTPATIENT)
Dept: HOSPITAL 53 - M SFHCPLAZ | Age: 61
End: 2021-11-17
Attending: PHYSICIAN ASSISTANT
Payer: COMMERCIAL

## 2021-11-17 DIAGNOSIS — J06.9: Primary | ICD-10-CM

## 2022-08-31 ENCOUNTER — HOSPITAL ENCOUNTER (OUTPATIENT)
Dept: HOSPITAL 53 - M SFHCADAM | Age: 62
End: 2022-08-31
Attending: PHYSICIAN ASSISTANT
Payer: COMMERCIAL

## 2022-08-31 DIAGNOSIS — E55.9: ICD-10-CM

## 2022-08-31 DIAGNOSIS — M81.0: ICD-10-CM

## 2022-08-31 DIAGNOSIS — E78.1: Primary | ICD-10-CM

## 2022-08-31 LAB
25(OH)D3 SERPL-MCNC: 26.6 NG/ML (ref 30–100)
ALBUMIN SERPL BCG-MCNC: 3.9 GM/DL (ref 3.2–5.2)
ALT SERPL W P-5'-P-CCNC: 72 U/L (ref 12–78)
BASOPHILS # BLD AUTO: 0 10^3/UL (ref 0–0.2)
BASOPHILS NFR BLD AUTO: 0.6 % (ref 0–1)
BILIRUB SERPL-MCNC: 0.4 MG/DL (ref 0.2–1)
BUN SERPL-MCNC: 9 MG/DL (ref 7–18)
CALCIUM SERPL-MCNC: 8.9 MG/DL (ref 8.8–10.2)
CHLORIDE SERPL-SCNC: 104 MEQ/L (ref 98–107)
CHOLEST SERPL-MCNC: 128 MG/DL (ref ?–200)
CHOLEST/HDLC SERPL: 3.28 {RATIO} (ref ?–5)
CO2 SERPL-SCNC: 24 MEQ/L (ref 21–32)
CREAT SERPL-MCNC: 1.11 MG/DL (ref 0.55–1.3)
EOSINOPHIL # BLD AUTO: 0.2 10^3/UL (ref 0–0.5)
EOSINOPHIL NFR BLD AUTO: 3 % (ref 0–3)
GFR SERPL CREATININE-BSD FRML MDRD: 52.7 ML/MIN/{1.73_M2} (ref 45–?)
GLUCOSE SERPL-MCNC: 89 MG/DL (ref 70–100)
HCT VFR BLD AUTO: 38.9 % (ref 36–47)
HDLC SERPL-MCNC: 39 MG/DL (ref 40–?)
HGB BLD-MCNC: 12.5 G/DL (ref 12–15.5)
LDLC SERPL CALC-MCNC: 64 MG/DL (ref ?–100)
LYMPHOCYTES # BLD AUTO: 2.1 10^3/UL (ref 1.5–5)
LYMPHOCYTES NFR BLD AUTO: 33 % (ref 24–44)
MCH RBC QN AUTO: 30.3 PG (ref 27–33)
MCHC RBC AUTO-ENTMCNC: 32.1 G/DL (ref 32–36.5)
MCV RBC AUTO: 94.2 FL (ref 80–96)
MONOCYTES # BLD AUTO: 0.7 10^3/UL (ref 0–0.8)
MONOCYTES NFR BLD AUTO: 10.6 % (ref 2–8)
NEUTROPHILS # BLD AUTO: 3.3 10^3/UL (ref 1.5–8.5)
NEUTROPHILS NFR BLD AUTO: 52.2 % (ref 36–66)
NONHDLC SERPL-MCNC: 89 MG/DL
PLATELET # BLD AUTO: 230 10^3/UL (ref 150–450)
POTASSIUM SERPL-SCNC: 4.1 MEQ/L (ref 3.5–5.1)
PROT SERPL-MCNC: 6.5 GM/DL (ref 6.4–8.2)
RBC # BLD AUTO: 4.13 10^6/UL (ref 4–5.4)
SODIUM SERPL-SCNC: 136 MEQ/L (ref 136–145)
TRIGL SERPL-MCNC: 126 MG/DL (ref ?–150)
TSH SERPL DL<=0.005 MIU/L-ACNC: 6 UIU/ML (ref 0.36–3.74)
WBC # BLD AUTO: 6.3 10^3/UL (ref 4–10)

## 2022-10-07 ENCOUNTER — HOSPITAL ENCOUNTER (OUTPATIENT)
Dept: HOSPITAL 53 - M SFHCDERM | Age: 62
End: 2022-10-07
Attending: PHYSICIAN ASSISTANT
Payer: COMMERCIAL

## 2022-10-07 DIAGNOSIS — E03.9: Primary | ICD-10-CM

## 2022-10-24 ENCOUNTER — HOSPITAL ENCOUNTER (OUTPATIENT)
Dept: HOSPITAL 53 - M WHC | Age: 62
End: 2022-10-24
Attending: PHYSICIAN ASSISTANT
Payer: COMMERCIAL

## 2022-10-24 DIAGNOSIS — Z13.820: ICD-10-CM

## 2022-10-24 DIAGNOSIS — Z12.31: Primary | ICD-10-CM

## 2023-03-20 ENCOUNTER — HOSPITAL ENCOUNTER (OUTPATIENT)
Dept: HOSPITAL 53 - M SFHCADAM | Age: 63
End: 2023-03-20
Attending: PHYSICIAN ASSISTANT
Payer: COMMERCIAL

## 2023-03-20 DIAGNOSIS — E03.9: ICD-10-CM

## 2023-03-20 DIAGNOSIS — E55.9: ICD-10-CM

## 2023-03-20 DIAGNOSIS — E78.2: Primary | ICD-10-CM

## 2023-03-20 LAB
25(OH)D3 SERPL-MCNC: 87.5 NG/ML (ref 20–100)
TSH SERPL DL<=0.005 MIU/L-ACNC: 1.05 UIU/ML (ref 0.55–4.78)

## 2023-03-27 ENCOUNTER — HOSPITAL ENCOUNTER (OUTPATIENT)
Dept: HOSPITAL 53 - M ADAMS | Age: 63
End: 2023-03-27
Attending: PHYSICIAN ASSISTANT
Payer: COMMERCIAL

## 2023-03-27 DIAGNOSIS — M25.552: Primary | ICD-10-CM

## 2023-04-10 ENCOUNTER — HOSPITAL ENCOUNTER (OUTPATIENT)
Dept: HOSPITAL 53 - M CARPUL | Age: 63
End: 2023-04-10
Attending: PHYSICIAN ASSISTANT
Payer: COMMERCIAL

## 2023-04-10 DIAGNOSIS — J43.1: Primary | ICD-10-CM

## 2023-04-24 ENCOUNTER — HOSPITAL ENCOUNTER (OUTPATIENT)
Dept: HOSPITAL 53 - M RAD | Age: 63
End: 2023-04-24
Attending: PHYSICIAN ASSISTANT
Payer: COMMERCIAL

## 2023-04-24 DIAGNOSIS — F17.210: ICD-10-CM

## 2023-04-24 DIAGNOSIS — Z12.2: Primary | ICD-10-CM

## 2023-04-24 DIAGNOSIS — J43.9: ICD-10-CM

## 2023-09-22 ENCOUNTER — HOSPITAL ENCOUNTER (OUTPATIENT)
Dept: HOSPITAL 53 - M WUC | Age: 63
End: 2023-09-22
Attending: PHYSICIAN ASSISTANT
Payer: COMMERCIAL

## 2023-09-22 DIAGNOSIS — E78.1: ICD-10-CM

## 2023-09-22 DIAGNOSIS — M81.0: ICD-10-CM

## 2023-09-22 DIAGNOSIS — E78.2: Primary | ICD-10-CM

## 2023-09-22 DIAGNOSIS — E55.9: ICD-10-CM

## 2023-09-22 DIAGNOSIS — K76.0: ICD-10-CM

## 2023-09-22 LAB
25(OH)D3 SERPL-MCNC: 78.3 NG/ML (ref 20–100)
ALBUMIN SERPL BCG-MCNC: 4 G/DL (ref 3.2–5.2)
ALP SERPL-CCNC: 80 U/L (ref 46–116)
ALT SERPL W P-5'-P-CCNC: 45 U/L (ref 7–40)
AST SERPL-CCNC: 28 U/L (ref ?–34)
BASOPHILS # BLD AUTO: 0 10^3/UL (ref 0–0.2)
BASOPHILS NFR BLD AUTO: 0.6 % (ref 0–1)
BILIRUB SERPL-MCNC: 0.3 MG/DL (ref 0.3–1.2)
BUN SERPL-MCNC: 15 MG/DL (ref 9–23)
CALCIUM SERPL-MCNC: 9.5 MG/DL (ref 8.3–10.6)
CHLORIDE SERPL-SCNC: 106 MMOL/L (ref 98–107)
CHOLEST SERPL-MCNC: 119 MG/DL (ref ?–200)
CHOLEST/HDLC SERPL: 3.09 {RATIO} (ref ?–5)
CO2 SERPL-SCNC: 26 MMOL/L (ref 20–31)
CREAT SERPL-MCNC: 0.85 MG/DL (ref 0.55–1.3)
EOSINOPHIL # BLD AUTO: 0.3 10^3/UL (ref 0–0.5)
EOSINOPHIL NFR BLD AUTO: 3.8 % (ref 0–3)
GFR SERPL CREATININE-BSD FRML MDRD: > 60 ML/MIN/{1.73_M2} (ref 45–?)
GLUCOSE SERPL-MCNC: 106 MG/DL (ref 74–106)
HCT VFR BLD AUTO: 38.8 % (ref 36–47)
HDLC SERPL-MCNC: 38.5 MG/DL (ref 40–?)
HGB BLD-MCNC: 12.5 G/DL (ref 12–15.5)
LDLC SERPL CALC-MCNC: 42.5 MG/DL (ref ?–100)
LYMPHOCYTES # BLD AUTO: 1.9 10^3/UL (ref 1.5–5)
LYMPHOCYTES NFR BLD AUTO: 26.5 % (ref 24–44)
MCH RBC QN AUTO: 30.3 PG (ref 27–33)
MCHC RBC AUTO-ENTMCNC: 32.2 G/DL (ref 32–36.5)
MCV RBC AUTO: 94.2 FL (ref 80–96)
MONOCYTES # BLD AUTO: 0.5 10^3/UL (ref 0–0.8)
MONOCYTES NFR BLD AUTO: 7.5 % (ref 2–8)
NEUTROPHILS # BLD AUTO: 4.3 10^3/UL (ref 1.5–8.5)
NEUTROPHILS NFR BLD AUTO: 60.9 % (ref 36–66)
NONHDLC SERPL-MCNC: 80.5 MG/DL
PLATELET # BLD AUTO: 239 10^3/UL (ref 150–450)
POTASSIUM SERPL-SCNC: 3.4 MMOL/L (ref 3.5–5.1)
PROT SERPL-MCNC: 6.7 G/DL (ref 5.7–8.2)
RBC # BLD AUTO: 4.12 10^6/UL (ref 4–5.4)
SODIUM SERPL-SCNC: 138 MMOL/L (ref 136–145)
TRIGL SERPL-MCNC: 190 MG/DL (ref ?–150)
TSH SERPL DL<=0.005 MIU/L-ACNC: 9.21 UIU/ML (ref 0.55–4.78)
WBC # BLD AUTO: 7.1 10^3/UL (ref 4–10)

## 2023-10-30 ENCOUNTER — HOSPITAL ENCOUNTER (OUTPATIENT)
Dept: HOSPITAL 53 - M WHC | Age: 63
End: 2023-10-30
Attending: PHYSICIAN ASSISTANT
Payer: COMMERCIAL

## 2023-10-30 DIAGNOSIS — Z12.31: Primary | ICD-10-CM

## 2023-11-08 ENCOUNTER — HOSPITAL ENCOUNTER (OUTPATIENT)
Dept: HOSPITAL 53 - M WUC | Age: 63
End: 2023-11-08
Attending: PHYSICIAN ASSISTANT
Payer: COMMERCIAL

## 2023-11-08 DIAGNOSIS — E03.9: Primary | ICD-10-CM

## 2024-04-30 ENCOUNTER — HOSPITAL ENCOUNTER (OUTPATIENT)
Dept: HOSPITAL 53 - M SFHCADAM | Age: 64
End: 2024-04-30
Attending: PHYSICIAN ASSISTANT
Payer: COMMERCIAL

## 2024-04-30 DIAGNOSIS — E55.9: ICD-10-CM

## 2024-04-30 DIAGNOSIS — K76.0: ICD-10-CM

## 2024-04-30 DIAGNOSIS — M81.0: ICD-10-CM

## 2024-04-30 DIAGNOSIS — E78.2: Primary | ICD-10-CM

## 2024-04-30 LAB
25(OH)D3 SERPL-MCNC: 41.9 NG/ML (ref 20–100)
ALBUMIN SERPL BCG-MCNC: 3.5 G/DL (ref 3.2–5.2)
ALP SERPL-CCNC: 61 U/L (ref 46–116)
ALT SERPL W P-5'-P-CCNC: 37 U/L (ref 7–40)
AST SERPL-CCNC: 23 U/L (ref ?–34)
BASOPHILS # BLD AUTO: 0 10^3/UL (ref 0–0.2)
BASOPHILS NFR BLD AUTO: 0.8 % (ref 0–1)
BILIRUB SERPL-MCNC: 0.4 MG/DL (ref 0.3–1.2)
BUN SERPL-MCNC: 19 MG/DL (ref 9–23)
CALCIUM SERPL-MCNC: 8.5 MG/DL (ref 8.3–10.6)
CHLORIDE SERPL-SCNC: 107 MMOL/L (ref 98–107)
CHOLEST SERPL-MCNC: 143 MG/DL (ref ?–200)
CHOLEST/HDLC SERPL: 3.47 {RATIO} (ref ?–5)
CO2 SERPL-SCNC: 26 MMOL/L (ref 20–31)
CREAT SERPL-MCNC: 0.88 MG/DL (ref 0.55–1.3)
EOSINOPHIL # BLD AUTO: 0.2 10^3/UL (ref 0–0.5)
EOSINOPHIL NFR BLD AUTO: 4.5 % (ref 0–3)
GFR SERPL CREATININE-BSD FRML MDRD: > 60 ML/MIN/{1.73_M2} (ref 45–?)
GLUCOSE SERPL-MCNC: 97 MG/DL (ref 74–106)
HCT VFR BLD AUTO: 36.7 % (ref 36–47)
HDLC SERPL-MCNC: 41.1 MG/DL (ref 40–?)
HGB BLD-MCNC: 11.9 G/DL (ref 12–15.5)
LDLC SERPL CALC-MCNC: 82.5 MG/DL (ref ?–100)
LYMPHOCYTES # BLD AUTO: 1.5 10^3/UL (ref 1.5–5)
LYMPHOCYTES NFR BLD AUTO: 27.8 % (ref 24–44)
MCH RBC QN AUTO: 31.3 PG (ref 27–33)
MCHC RBC AUTO-ENTMCNC: 32.4 G/DL (ref 32–36.5)
MCV RBC AUTO: 96.6 FL (ref 80–96)
MONOCYTES # BLD AUTO: 0.6 10^3/UL (ref 0–0.8)
MONOCYTES NFR BLD AUTO: 10.5 % (ref 2–8)
NEUTROPHILS # BLD AUTO: 3 10^3/UL (ref 1.5–8.5)
NEUTROPHILS NFR BLD AUTO: 55.8 % (ref 36–66)
NONHDLC SERPL-MCNC: 101.9 MG/DL
PLATELET # BLD AUTO: 216 10^3/UL (ref 150–450)
POTASSIUM SERPL-SCNC: 4.2 MMOL/L (ref 3.5–5.1)
PROT SERPL-MCNC: 6 G/DL (ref 5.7–8.2)
RBC # BLD AUTO: 3.8 10^6/UL (ref 4–5.4)
SODIUM SERPL-SCNC: 140 MMOL/L (ref 136–145)
TRIGL SERPL-MCNC: 97 MG/DL (ref ?–150)
TSH SERPL DL<=0.005 MIU/L-ACNC: 0.76 UIU/ML (ref 0.55–4.78)
WBC # BLD AUTO: 5.3 10^3/UL (ref 4–10)

## 2024-07-22 ENCOUNTER — HOSPITAL ENCOUNTER (OUTPATIENT)
Dept: HOSPITAL 53 - M RAD | Age: 64
End: 2024-07-22
Attending: PHYSICIAN ASSISTANT
Payer: COMMERCIAL

## 2024-07-22 DIAGNOSIS — Z87.891: ICD-10-CM

## 2024-07-22 DIAGNOSIS — Z12.2: Primary | ICD-10-CM

## 2024-08-06 ENCOUNTER — HOSPITAL ENCOUNTER (OUTPATIENT)
Dept: HOSPITAL 53 - M RAD | Age: 64
End: 2024-08-06
Attending: PHYSICIAN ASSISTANT
Payer: COMMERCIAL

## 2024-08-06 DIAGNOSIS — R29.818: ICD-10-CM

## 2024-08-06 DIAGNOSIS — M51.36: Primary | ICD-10-CM

## 2024-08-06 PROCEDURE — 72158 MRI LUMBAR SPINE W/O & W/DYE: CPT

## 2024-08-26 ENCOUNTER — HOSPITAL ENCOUNTER (OUTPATIENT)
Dept: HOSPITAL 53 - M SOG | Age: 64
End: 2024-08-26
Attending: ORTHOPAEDIC SURGERY
Payer: COMMERCIAL

## 2024-08-26 DIAGNOSIS — M54.50: Primary | ICD-10-CM

## 2024-10-01 ENCOUNTER — HOSPITAL ENCOUNTER (OUTPATIENT)
Dept: HOSPITAL 53 - M LAB REF | Age: 64
End: 2024-10-01
Attending: PHYSICAL MEDICINE & REHABILITATION
Payer: COMMERCIAL

## 2024-10-01 DIAGNOSIS — Z01.818: Primary | ICD-10-CM

## 2024-10-01 LAB
INR PPP: 1.07
PLATELET # BLD AUTO: 273 10^3/UL (ref 150–450)
PROTHROMBIN TIME: 13.6 SECONDS (ref 12.5–14.5)

## 2024-10-02 ENCOUNTER — HOSPITAL ENCOUNTER (OUTPATIENT)
Dept: HOSPITAL 53 - M LAB REF | Age: 64
End: 2024-10-02
Attending: PHYSICAL MEDICINE & REHABILITATION
Payer: COMMERCIAL

## 2024-10-02 DIAGNOSIS — Z01.818: Primary | ICD-10-CM

## 2024-10-02 LAB
APTT BLD: 28.9 SECONDS (ref 24.8–34.2)
INR PPP: 1.09
PROTHROMBIN TIME: 13.8 SECONDS (ref 12.5–14.5)

## 2024-10-08 ENCOUNTER — HOSPITAL ENCOUNTER (OUTPATIENT)
Dept: HOSPITAL 53 - M SFHCADAM | Age: 64
End: 2024-10-08
Attending: PHYSICIAN ASSISTANT
Payer: COMMERCIAL

## 2024-10-08 DIAGNOSIS — R82.90: Primary | ICD-10-CM

## 2024-10-08 LAB
APPEARANCE UR: (no result)
BACTERIA UR QL AUTO: NEGATIVE
BILIRUB UR QL STRIP.AUTO: NEGATIVE
GLUCOSE UR QL STRIP.AUTO: NEGATIVE MG/DL
HGB UR QL STRIP.AUTO: (no result)
KETONES UR QL STRIP.AUTO: NEGATIVE MG/DL
LEUKOCYTE ESTERASE UR QL STRIP.AUTO: NEGATIVE
MUCOUS THREADS URNS QL MICRO: (no result)
NITRITE UR QL STRIP.AUTO: NEGATIVE
PH UR STRIP.AUTO: 6 UNITS (ref 5–9)
PROT UR QL STRIP.AUTO: NEGATIVE MG/DL
RBC # UR AUTO: 1 /HPF (ref 0–3)
SP GR UR STRIP.AUTO: 1.02 (ref 1–1.03)
SQUAMOUS #/AREA URNS AUTO: 0 /HPF (ref 0–6)
UROBILINOGEN UR QL STRIP.AUTO: 0.2 MG/DL (ref 0–2)
WBC #/AREA URNS AUTO: 7 /HPF (ref 0–3)

## 2024-11-01 ENCOUNTER — HOSPITAL ENCOUNTER (OUTPATIENT)
Dept: HOSPITAL 53 - M PLARAD | Age: 64
End: 2024-11-01
Attending: PHYSICAL MEDICINE & REHABILITATION
Payer: COMMERCIAL

## 2024-11-01 DIAGNOSIS — M51.27: ICD-10-CM

## 2024-11-01 DIAGNOSIS — M51.360: Primary | ICD-10-CM

## 2024-11-04 ENCOUNTER — HOSPITAL ENCOUNTER (OUTPATIENT)
Dept: HOSPITAL 53 - M WHC | Age: 64
End: 2024-11-04
Attending: PHYSICIAN ASSISTANT
Payer: COMMERCIAL

## 2024-11-04 DIAGNOSIS — M81.0: ICD-10-CM

## 2024-11-04 DIAGNOSIS — M85.852: ICD-10-CM

## 2024-11-04 DIAGNOSIS — M85.851: ICD-10-CM

## 2024-11-04 DIAGNOSIS — Z12.31: Primary | ICD-10-CM

## 2024-11-04 DIAGNOSIS — Z13.820: ICD-10-CM

## 2024-12-09 ENCOUNTER — HOSPITAL ENCOUNTER (OUTPATIENT)
Dept: HOSPITAL 53 - M SFHCADAM | Age: 64
End: 2024-12-09
Attending: PHYSICIAN ASSISTANT
Payer: COMMERCIAL

## 2024-12-09 DIAGNOSIS — E03.9: Primary | ICD-10-CM

## 2024-12-09 LAB
T4 FREE SERPL-MCNC: 0.9 NG/DL (ref 0.89–1.76)
TSH SERPL DL<=0.005 MIU/L-ACNC: 5.92 UIU/ML (ref 0.55–4.78)

## 2025-01-07 ENCOUNTER — HOSPITAL ENCOUNTER (OUTPATIENT)
Dept: HOSPITAL 53 - M WHC | Age: 65
End: 2025-01-07
Attending: PHYSICIAN ASSISTANT
Payer: COMMERCIAL

## 2025-01-07 DIAGNOSIS — M81.0: Primary | ICD-10-CM

## 2025-01-10 ENCOUNTER — HOSPITAL ENCOUNTER (OUTPATIENT)
Dept: HOSPITAL 53 - M RAD | Age: 65
End: 2025-01-10
Attending: PHYSICAL MEDICINE & REHABILITATION
Payer: COMMERCIAL

## 2025-01-10 ENCOUNTER — HOSPITAL ENCOUNTER (OUTPATIENT)
Dept: HOSPITAL 53 - M LAB | Age: 65
End: 2025-01-10
Attending: PHYSICIAN ASSISTANT
Payer: COMMERCIAL

## 2025-01-10 DIAGNOSIS — M51.27: Primary | ICD-10-CM

## 2025-01-10 DIAGNOSIS — E03.9: ICD-10-CM

## 2025-01-10 DIAGNOSIS — K76.0: ICD-10-CM

## 2025-01-10 DIAGNOSIS — M81.0: ICD-10-CM

## 2025-01-10 DIAGNOSIS — E55.9: ICD-10-CM

## 2025-01-10 DIAGNOSIS — E78.2: Primary | ICD-10-CM

## 2025-01-10 LAB
25(OH)D3 SERPL-MCNC: 46.4 NG/ML (ref 20–100)
ALBUMIN SERPL BCG-MCNC: 3.9 G/DL (ref 3.2–5.2)
ALP SERPL-CCNC: 73 U/L (ref 35–104)
ALT SERPL W P-5'-P-CCNC: 35 U/L (ref 7–40)
AST SERPL-CCNC: 28 U/L (ref ?–34)
BASOPHILS # BLD AUTO: 0.1 10^3/UL (ref 0–0.2)
BASOPHILS NFR BLD AUTO: 0.8 % (ref 0–1)
BILIRUB SERPL-MCNC: 0.3 MG/DL (ref 0.3–1.2)
BUN SERPL-MCNC: 26 MG/DL (ref 9–23)
CALCIUM SERPL-MCNC: 8.8 MG/DL (ref 8.3–10.6)
CHLORIDE SERPL-SCNC: 110 MMOL/L (ref 98–107)
CHOLEST SERPL-MCNC: 127 MG/DL (ref ?–200)
CHOLEST/HDLC SERPL: 3.54 {RATIO} (ref ?–5)
CO2 SERPL-SCNC: 27 MMOL/L (ref 20–31)
CREAT SERPL-MCNC: 1.09 MG/DL (ref 0.55–1.3)
EOSINOPHIL # BLD AUTO: 0.2 10^3/UL (ref 0–0.5)
EOSINOPHIL NFR BLD AUTO: 3.2 % (ref 0–3)
GFR SERPL CREATININE-BSD FRML MDRD: 53.5 ML/MIN/{1.73_M2} (ref 45–?)
GLUCOSE SERPL-MCNC: 115 MG/DL (ref 74–106)
HCT VFR BLD AUTO: 37.9 % (ref 36–47)
HDLC SERPL-MCNC: 35.8 MG/DL (ref 40–?)
HGB BLD-MCNC: 12.7 G/DL (ref 12–15.5)
LDLC SERPL CALC-MCNC: 63.8 MG/DL (ref ?–100)
LYMPHOCYTES # BLD AUTO: 1.9 10^3/UL (ref 1.5–5)
LYMPHOCYTES NFR BLD AUTO: 30.9 % (ref 24–44)
MCH RBC QN AUTO: 30.5 PG (ref 27–33)
MCHC RBC AUTO-ENTMCNC: 33.5 G/DL (ref 32–36.5)
MCV RBC AUTO: 91.1 FL (ref 80–96)
MONOCYTES # BLD AUTO: 0.7 10^3/UL (ref 0–0.8)
MONOCYTES NFR BLD AUTO: 11.2 % (ref 2–8)
NEUTROPHILS # BLD AUTO: 3.2 10^3/UL (ref 1.5–8.5)
NEUTROPHILS NFR BLD AUTO: 53.1 % (ref 36–66)
NONHDLC SERPL-MCNC: 91.2 MG/DL
PLATELET # BLD AUTO: 225 10^3/UL (ref 150–450)
POTASSIUM SERPL-SCNC: 4.8 MMOL/L (ref 3.5–5.1)
PROT SERPL-MCNC: 6.7 G/DL (ref 5.7–8.2)
RBC # BLD AUTO: 4.16 10^6/UL (ref 4–5.4)
SODIUM SERPL-SCNC: 141 MMOL/L (ref 136–145)
T4 FREE SERPL-MCNC: 1.18 NG/DL (ref 0.89–1.76)
TRIGL SERPL-MCNC: 137 MG/DL (ref ?–150)
TSH SERPL DL<=0.005 MIU/L-ACNC: 3.8 UIU/ML (ref 0.55–4.78)
WBC # BLD AUTO: 6 10^3/UL (ref 4–10)

## 2025-03-10 ENCOUNTER — HOSPITAL ENCOUNTER (OUTPATIENT)
Dept: HOSPITAL 53 - M RAD | Age: 65
End: 2025-03-10
Attending: PHYSICIAN ASSISTANT
Payer: COMMERCIAL

## 2025-03-10 DIAGNOSIS — K76.0: Primary | ICD-10-CM

## 2025-03-17 ENCOUNTER — HOSPITAL ENCOUNTER (OUTPATIENT)
Dept: HOSPITAL 53 - M ADAMS | Age: 65
End: 2025-03-17
Attending: PHYSICIAN ASSISTANT
Payer: COMMERCIAL

## 2025-03-17 DIAGNOSIS — R76.12: Primary | ICD-10-CM
